# Patient Record
Sex: FEMALE | Race: BLACK OR AFRICAN AMERICAN | NOT HISPANIC OR LATINO | Employment: OTHER | URBAN - METROPOLITAN AREA
[De-identification: names, ages, dates, MRNs, and addresses within clinical notes are randomized per-mention and may not be internally consistent; named-entity substitution may affect disease eponyms.]

---

## 2017-02-21 ENCOUNTER — TRANSCRIBE ORDERS (OUTPATIENT)
Dept: LAB | Facility: HOSPITAL | Age: 72
End: 2017-02-21

## 2017-02-21 ENCOUNTER — APPOINTMENT (OUTPATIENT)
Dept: LAB | Facility: HOSPITAL | Age: 72
End: 2017-02-21
Payer: COMMERCIAL

## 2017-02-21 DIAGNOSIS — E66.9 OBESITY, UNSPECIFIED: ICD-10-CM

## 2017-02-21 DIAGNOSIS — I10 UNSPECIFIED ESSENTIAL HYPERTENSION: ICD-10-CM

## 2017-02-21 DIAGNOSIS — I10 UNSPECIFIED ESSENTIAL HYPERTENSION: Primary | ICD-10-CM

## 2017-02-21 LAB
ANION GAP SERPL CALCULATED.3IONS-SCNC: 6 MMOL/L (ref 4–13)
BUN SERPL-MCNC: 13 MG/DL (ref 5–25)
CALCIUM SERPL-MCNC: 9.3 MG/DL (ref 8.3–10.1)
CHLORIDE SERPL-SCNC: 110 MMOL/L (ref 100–108)
CHOLEST SERPL-MCNC: 137 MG/DL (ref 50–200)
CO2 SERPL-SCNC: 28 MMOL/L (ref 21–32)
CREAT SERPL-MCNC: 1.39 MG/DL (ref 0.6–1.3)
GFR SERPL CREATININE-BSD FRML MDRD: 45.3 ML/MIN/1.73SQ M
GLUCOSE SERPL-MCNC: 101 MG/DL (ref 65–140)
HDLC SERPL-MCNC: 71 MG/DL (ref 40–60)
LDLC SERPL CALC-MCNC: 52 MG/DL (ref 0–100)
POTASSIUM SERPL-SCNC: 4.8 MMOL/L (ref 3.5–5.3)
SODIUM SERPL-SCNC: 144 MMOL/L (ref 136–145)
TRIGL SERPL-MCNC: 68 MG/DL
TSH SERPL DL<=0.05 MIU/L-ACNC: 1.15 UIU/ML (ref 0.36–3.74)

## 2017-02-21 PROCEDURE — 36415 COLL VENOUS BLD VENIPUNCTURE: CPT

## 2017-02-21 PROCEDURE — 80061 LIPID PANEL: CPT

## 2017-02-21 PROCEDURE — 80048 BASIC METABOLIC PNL TOTAL CA: CPT

## 2017-02-21 PROCEDURE — 84443 ASSAY THYROID STIM HORMONE: CPT

## 2017-05-12 ENCOUNTER — TRANSCRIBE ORDERS (OUTPATIENT)
Dept: RADIOLOGY | Facility: HOSPITAL | Age: 72
End: 2017-05-12

## 2017-05-12 ENCOUNTER — HOSPITAL ENCOUNTER (OUTPATIENT)
Dept: RADIOLOGY | Facility: HOSPITAL | Age: 72
Discharge: HOME | End: 2017-05-12
Payer: COMMERCIAL

## 2017-05-12 DIAGNOSIS — M54.12 BRACHIAL NEURITIS OR RADICULITIS NOS: Primary | ICD-10-CM

## 2017-05-12 DIAGNOSIS — M54.12 BRACHIAL NEURITIS OR RADICULITIS NOS: ICD-10-CM

## 2017-05-12 DIAGNOSIS — M43.6 CONTRACTURE OF NECK: ICD-10-CM

## 2017-05-12 PROCEDURE — 72052 X-RAY EXAM NECK SPINE 6/>VWS: CPT

## 2018-01-29 ENCOUNTER — TRANSCRIBE ORDERS (OUTPATIENT)
Dept: LAB | Facility: HOSPITAL | Age: 73
End: 2018-01-29

## 2018-01-29 ENCOUNTER — APPOINTMENT (OUTPATIENT)
Dept: LAB | Facility: HOSPITAL | Age: 73
End: 2018-01-29
Payer: MEDICARE

## 2018-01-29 DIAGNOSIS — N18.30 CHRONIC KIDNEY DISEASE, STAGE III (MODERATE) (HCC): Primary | ICD-10-CM

## 2018-01-29 DIAGNOSIS — I12.0 PARENCHYMAL RENAL HYPERTENSION, STAGE 5 CHRONIC KIDNEY DISEASE OR END STAGE RENAL DISEASE (HCC): ICD-10-CM

## 2018-01-29 DIAGNOSIS — N18.30 CHRONIC KIDNEY DISEASE, STAGE III (MODERATE) (HCC): ICD-10-CM

## 2018-01-29 DIAGNOSIS — N28.1 ACQUIRED CYST OF KIDNEY: ICD-10-CM

## 2018-01-29 LAB
25(OH)D3 SERPL-MCNC: 33.6 NG/ML (ref 30–100)
ANION GAP SERPL CALCULATED.3IONS-SCNC: 5 MMOL/L (ref 4–13)
BASOPHILS # BLD AUTO: 0.03 THOUSANDS/ΜL (ref 0–0.1)
BASOPHILS NFR BLD AUTO: 1 % (ref 0–1)
BILIRUB UR QL STRIP: NEGATIVE
BUN SERPL-MCNC: 14 MG/DL (ref 5–25)
CALCIUM SERPL-MCNC: 9 MG/DL (ref 8.3–10.1)
CHLORIDE SERPL-SCNC: 108 MMOL/L (ref 100–108)
CLARITY UR: NORMAL
CO2 SERPL-SCNC: 29 MMOL/L (ref 21–32)
COLOR UR: YELLOW
CREAT SERPL-MCNC: 1.23 MG/DL (ref 0.6–1.3)
CREAT UR-MCNC: 130 MG/DL
EOSINOPHIL # BLD AUTO: 0.16 THOUSAND/ΜL (ref 0–0.61)
EOSINOPHIL NFR BLD AUTO: 4 % (ref 0–6)
ERYTHROCYTE [DISTWIDTH] IN BLOOD BY AUTOMATED COUNT: 14.8 % (ref 11.6–15.1)
GFR SERPL CREATININE-BSD FRML MDRD: 51 ML/MIN/1.73SQ M
GLUCOSE P FAST SERPL-MCNC: 88 MG/DL (ref 65–99)
GLUCOSE UR STRIP-MCNC: NEGATIVE MG/DL
HCT VFR BLD AUTO: 36 % (ref 34.8–46.1)
HGB BLD-MCNC: 11.9 G/DL (ref 11.5–15.4)
HGB UR QL STRIP.AUTO: NEGATIVE
KETONES UR STRIP-MCNC: NEGATIVE MG/DL
LEUKOCYTE ESTERASE UR QL STRIP: NEGATIVE
LYMPHOCYTES # BLD AUTO: 1.85 THOUSANDS/ΜL (ref 0.6–4.47)
LYMPHOCYTES NFR BLD AUTO: 43 % (ref 14–44)
MCH RBC QN AUTO: 26.1 PG (ref 26.8–34.3)
MCHC RBC AUTO-ENTMCNC: 33.1 G/DL (ref 31.4–37.4)
MCV RBC AUTO: 79 FL (ref 82–98)
MONOCYTES # BLD AUTO: 0.37 THOUSAND/ΜL (ref 0.17–1.22)
MONOCYTES NFR BLD AUTO: 9 % (ref 4–12)
NEUTROPHILS # BLD AUTO: 1.79 THOUSANDS/ΜL (ref 1.85–7.62)
NEUTS SEG NFR BLD AUTO: 43 % (ref 43–75)
NITRITE UR QL STRIP: NEGATIVE
NRBC BLD AUTO-RTO: 0 /100 WBCS
PH UR STRIP.AUTO: 6 [PH] (ref 4.5–8)
PLATELET # BLD AUTO: 221 THOUSANDS/UL (ref 149–390)
PMV BLD AUTO: 10 FL (ref 8.9–12.7)
POTASSIUM SERPL-SCNC: 4.3 MMOL/L (ref 3.5–5.3)
PROT UR STRIP-MCNC: NEGATIVE MG/DL
PROT UR-MCNC: 6 MG/DL
PROT/CREAT UR: 0.05 MG/G{CREAT} (ref 0–0.1)
PTH-INTACT SERPL-MCNC: 81.6 PG/ML (ref 14–72)
RBC # BLD AUTO: 4.56 MILLION/UL (ref 3.81–5.12)
SODIUM SERPL-SCNC: 142 MMOL/L (ref 136–145)
SP GR UR STRIP.AUTO: 1.01 (ref 1–1.03)
UROBILINOGEN UR QL STRIP.AUTO: 0.2 E.U./DL
WBC # BLD AUTO: 4.2 THOUSAND/UL (ref 4.31–10.16)

## 2018-01-29 PROCEDURE — 80048 BASIC METABOLIC PNL TOTAL CA: CPT

## 2018-01-29 PROCEDURE — 83970 ASSAY OF PARATHORMONE: CPT

## 2018-01-29 PROCEDURE — 84156 ASSAY OF PROTEIN URINE: CPT

## 2018-01-29 PROCEDURE — 82570 ASSAY OF URINE CREATININE: CPT

## 2018-01-29 PROCEDURE — 81003 URINALYSIS AUTO W/O SCOPE: CPT

## 2018-01-29 PROCEDURE — 85025 COMPLETE CBC W/AUTO DIFF WBC: CPT

## 2018-01-29 PROCEDURE — 82306 VITAMIN D 25 HYDROXY: CPT

## 2018-01-29 PROCEDURE — 36415 COLL VENOUS BLD VENIPUNCTURE: CPT

## 2018-11-05 ENCOUNTER — OFFICE VISIT (OUTPATIENT)
Dept: LAB | Facility: HOSPITAL | Age: 73
End: 2018-11-05
Payer: MEDICARE

## 2018-11-05 ENCOUNTER — TRANSCRIBE ORDERS (OUTPATIENT)
Dept: LAB | Facility: HOSPITAL | Age: 73
End: 2018-11-05

## 2018-11-05 DIAGNOSIS — R00.1 SEVERE SINUS BRADYCARDIA: Primary | ICD-10-CM

## 2018-11-05 DIAGNOSIS — R00.1 SEVERE SINUS BRADYCARDIA: ICD-10-CM

## 2018-11-05 LAB
ATRIAL RATE: 61 BPM
P AXIS: 36 DEGREES
PR INTERVAL: 108 MS
QRS AXIS: -51 DEGREES
QRSD INTERVAL: 80 MS
QT INTERVAL: 446 MS
QTC INTERVAL: 448 MS
T WAVE AXIS: -4 DEGREES
VENTRICULAR RATE: 61 BPM

## 2018-11-05 PROCEDURE — 93010 ELECTROCARDIOGRAM REPORT: CPT | Performed by: INTERNAL MEDICINE

## 2018-11-05 PROCEDURE — 93005 ELECTROCARDIOGRAM TRACING: CPT

## 2018-12-05 ENCOUNTER — OFFICE VISIT (OUTPATIENT)
Dept: CARDIOLOGY CLINIC | Facility: CLINIC | Age: 73
End: 2018-12-05
Payer: MEDICARE

## 2018-12-05 VITALS
BODY MASS INDEX: 30.23 KG/M2 | HEART RATE: 72 BPM | DIASTOLIC BLOOD PRESSURE: 78 MMHG | SYSTOLIC BLOOD PRESSURE: 136 MMHG | HEIGHT: 58 IN | WEIGHT: 144 LBS

## 2018-12-05 DIAGNOSIS — R94.31 ABNORMAL EKG: Primary | ICD-10-CM

## 2018-12-05 DIAGNOSIS — E78.00 HYPERCHOLESTEROLEMIA: ICD-10-CM

## 2018-12-05 PROCEDURE — 99203 OFFICE O/P NEW LOW 30 MIN: CPT | Performed by: INTERNAL MEDICINE

## 2018-12-05 RX ORDER — OXYBUTYNIN CHLORIDE 5 MG/1
TABLET ORAL
COMMUNITY
Start: 2018-11-27 | End: 2019-04-17

## 2018-12-05 RX ORDER — AMLODIPINE BESYLATE 5 MG/1
5 TABLET ORAL DAILY
COMMUNITY
Start: 2018-10-08

## 2018-12-05 RX ORDER — FLUOXETINE 10 MG/1
CAPSULE ORAL
COMMUNITY
Start: 2018-09-26

## 2018-12-05 RX ORDER — BUSPIRONE HYDROCHLORIDE 7.5 MG/1
TABLET ORAL
COMMUNITY
Start: 2018-11-12

## 2018-12-05 RX ORDER — ACETAMINOPHEN 500 MG
1 TABLET ORAL
COMMUNITY
Start: 2013-12-12

## 2018-12-05 RX ORDER — ATORVASTATIN CALCIUM 20 MG/1
20 TABLET, FILM COATED ORAL DAILY
COMMUNITY
Start: 2018-10-08

## 2018-12-05 RX ORDER — LISINOPRIL 40 MG/1
40 TABLET ORAL DAILY
COMMUNITY
Start: 2018-10-08

## 2018-12-05 NOTE — PROGRESS NOTES
Cardiology Follow Up    Kena Woodard  1945  [id number]  [address] [facility] [address] [place] [address]  [phone]    1  Abnormal EKG  Holter monitor - 24 hour    Stress test only, exercise    Echo complete with contrast if indicated   2  Hypercholesterolemia         Interval History:   Cardiology consultation  68-year-old [facility] female who has no previous cardiac history  Longstanding history of hypertension over 2 decades, she did undergo an EKG recently, it was described as bradycardic  Personal review, rhythm is sinus rhythm with frequent premature atrial contractions  He is described as short ND which I do not agree with, and there is evidence of poor R-wave progression across the precordium  The patient feels tired at times but she is still very active, she was regularly  She has no exertional symptoms including chest pain or dyspnea, she denies syncope or presyncope  She is on chronic  beta-blocker therapy, the dose was recently reduced because of that     She also history of dyslipidemia on statin therapy  LDL of 52 with an HDL 71 recently done  She has known vascular disease, carotid duplex 2 years ago revealed no significant carotid disease and occluded right vertebral artery  She was told she has chronic kidney disease, recent creatinine of 1 2, GFR in the 50s  Patient Active Problem List   Diagnosis    Abnormal EKG    Hypercholesterolemia     No past medical history on file  Social History     Social History    Marital status: Single     Spouse name: N/A    Number of children: N/A    Years of education: N/A     Occupational History    Not on file       Social History Main Topics    Smoking status: Former Smoker     Types: Cigarettes     Quit date: 1990    Smokeless tobacco: Never Used    Alcohol use Not on file    Drug use: Unknown    Sexual activity: Not on file Other Topics Concern    Not on file     Social History Narrative    No narrative on file      No family history on file  No past surgical history on file  Current Outpatient Prescriptions:     acetaminophen (TYLENOL) 500 mg tablet, Take 1 tablet by mouth, Disp: , Rfl:     amLODIPine (NORVASC) 5 mg tablet, , Disp: , Rfl:     aspirin 81 MG tablet, Take by mouth, Disp: , Rfl:     atorvastatin (LIPITOR) 20 mg tablet, , Disp: , Rfl:     busPIRone (BUSPAR) 7 5 mg tablet, , Disp: , Rfl:     cholecalciferol (VITAMIN D3) 1,000 units tablet, Take 1 tablet by mouth daily, Disp: , Rfl:     FLUoxetine (PROzac) 10 mg capsule, , Disp: , Rfl:     lisinopril (ZESTRIL) 40 mg tablet, , Disp: , Rfl:     metoprolol tartrate (LOPRESSOR) 25 mg tablet, , Disp: , Rfl:     oxybutynin (DITROPAN) 5 mg tablet, , Disp: , Rfl:   Allergies   Allergen Reactions    Penicillins      Other reaction(s): reports yeast infection  Category: Adverse Reaction;        Labs:  Office Visit on 11/05/2018   Component Date Value    Ventricular Rate 11/05/2018 61     Atrial Rate 11/05/2018 61     RI Interval 11/05/2018 108     QRSD Interval 11/05/2018 80     QT Interval 11/05/2018 446     QTC Interval 11/05/2018 448     P Axis 11/05/2018 36     QRS Axis 11/05/2018 -46     T Wave Axis 11/05/2018 -4      Imaging: No results found  Review of Systems:  Review of Systems   Constitutional: Positive for fatigue  Negative for activity change, appetite change, chills, diaphoresis, fever and unexpected weight change  HENT: Negative for nosebleeds  Eyes: Negative for visual disturbance  Respiratory: Negative for apnea, cough, choking, chest tightness, shortness of breath, wheezing and stridor  Cardiovascular: Negative for chest pain, palpitations and leg swelling  Gastrointestinal: Negative for anal bleeding  Endocrine: Negative for cold intolerance  Genitourinary: Negative for difficulty urinating and hematuria  Musculoskeletal: Positive for arthralgias  Negative for gait problem and myalgias  Skin: Negative for pallor  Allergic/Immunologic: Negative for immunocompromised state  Neurological: Negative for dizziness, tremors, syncope, facial asymmetry, speech difficulty, weakness and light-headedness  Hematological: Does not bruise/bleed easily  Psychiatric/Behavioral: Positive for sleep disturbance  Negative for confusion  Physical Exam:  Physical Exam   Constitutional: She is oriented to person, place, and time  She appears well-developed  No distress  Eyes: No scleral icterus  Neck: No JVD present  Cardiovascular: Normal rate, normal heart sounds and intact distal pulses  Exam reveals no gallop and no friction rub  No murmur heard  Frequent extra systoles   Pulmonary/Chest: Effort normal and breath sounds normal  No respiratory distress  She has no wheezes  She has no rales  Abdominal: Soft  Bowel sounds are normal    Neurological: She is alert and oriented to person, place, and time  Skin: Skin is warm and dry  No rash noted  She is not diaphoretic  No erythema  Psychiatric: She has a normal mood and affect  Discussion/Summary:  Sinus bradycardia chronic beta-blocker therapy  No history high-grade AV block  No clear-cut indication for any pacemaker therapy at the present time  Will do Holter monitor as well as stress test and echocardiogram to assess chronotropic competence given the associated abnormal EKG  Further recommendations pending results of the testing  No restrictions on the medications  Will continue low-dose beta-blocker for the time being

## 2018-12-18 ENCOUNTER — TELEPHONE (OUTPATIENT)
Dept: UROLOGY | Facility: MEDICAL CENTER | Age: 73
End: 2018-12-18

## 2018-12-18 NOTE — TELEPHONE ENCOUNTER
Reason for appointment/Complaint/Diagnosis : Stressed Bladder    Insurance: MCR    History of Cancer? HP                       If yes, what kind? No    Previous urologist?     Dr Patterson                 Records requested/where? No    Outside testing/where? No    Location Preference for office visit?  [facility]

## 2019-01-10 ENCOUNTER — HOSPITAL ENCOUNTER (OUTPATIENT)
Dept: NON INVASIVE DIAGNOSTICS | Facility: CLINIC | Age: 74
Discharge: HOME | End: 2019-01-10
Payer: MEDICARE

## 2019-01-10 DIAGNOSIS — R94.31 ABNORMAL EKG: ICD-10-CM

## 2019-01-10 PROCEDURE — 93306 TTE W/DOPPLER COMPLETE: CPT

## 2019-01-10 PROCEDURE — 93225 XTRNL ECG REC<48 HRS REC: CPT

## 2019-01-10 PROCEDURE — 93017 CV STRESS TEST TRACING ONLY: CPT

## 2019-01-10 PROCEDURE — 93226 XTRNL ECG REC<48 HR SCAN A/R: CPT

## 2019-01-10 PROCEDURE — 93306 TTE W/DOPPLER COMPLETE: CPT | Performed by: INTERNAL MEDICINE

## 2019-01-10 PROCEDURE — 93018 CV STRESS TEST I&R ONLY: CPT | Performed by: INTERNAL MEDICINE

## 2019-01-10 PROCEDURE — 93016 CV STRESS TEST SUPVJ ONLY: CPT | Performed by: INTERNAL MEDICINE

## 2019-01-11 LAB
CHEST PAIN STATEMENT: NORMAL
MAX DIASTOLIC BP: 80 MMHG
MAX HEART RATE: 118 BPM
MAX PREDICTED HEART RATE: 147 BPM
MAX. SYSTOLIC BP: 160 MMHG
PROTOCOL NAME: NORMAL
TARGET HR FORMULA: NORMAL
TEST INDICATION: NORMAL
TIME IN EXERCISE PHASE: NORMAL

## 2019-01-11 PROCEDURE — 93227 XTRNL ECG REC<48 HR R&I: CPT | Performed by: INTERNAL MEDICINE

## 2019-01-25 ENCOUNTER — OFFICE VISIT (OUTPATIENT)
Dept: UROLOGY | Facility: AMBULATORY SURGERY CENTER | Age: 74
End: 2019-01-25
Payer: MEDICARE

## 2019-01-25 VITALS
HEART RATE: 59 BPM | WEIGHT: 143 LBS | HEIGHT: 59 IN | DIASTOLIC BLOOD PRESSURE: 60 MMHG | SYSTOLIC BLOOD PRESSURE: 130 MMHG | BODY MASS INDEX: 28.83 KG/M2

## 2019-01-25 DIAGNOSIS — N39.41 URGE INCONTINENCE OF URINE: Primary | ICD-10-CM

## 2019-01-25 PROCEDURE — 99204 OFFICE O/P NEW MOD 45 MIN: CPT | Performed by: UROLOGY

## 2019-01-25 NOTE — LETTER
January 25, 2019     Zuleyma Mehtaemiliano Anetaemily [address] [facility]    Patient: Fidela Apley   YOB: 1945   Date of Visit: 1/25/2019       Dear Dr Adalberto Barreto: Thank you for referring Poppy Espino to me for evaluation  Below are my notes for this consultation  If you have questions, please do not hesitate to call me  I look forward to following your patient along with you  Sincerely,        Bren Quintanilla MD        CC: No Recipients  Bren Quintanilla MD  1/25/2019  2:49 PM  Sign at close encounter  1/25/2019    Del Butler  1945  [id number]        Assessment  Urinary urgency and incontinence    Plan  I recommend continuing her current medication and adding Myrbetriq 50 mg   Prescription was sent to her pharmacy  It appears that this is covered by her insurance  I also recommend further evaluation with pelvic examination and cystoscopy to evaluate the bladder outlet and any postoperative changes and possibility of mesh erosion as result of her surgery  We will also check a postvoid residual at that time  Patient understands and agrees with this plan  History of Present Illness  Del Clay is a 68 y o  female previously seen in the Urology Clinic in 2016  At the time she was on oxybutynin 15 mg  Currently she is taking oxybutynin 10 mg twice daily, she thinks, and continues to have urinary urgency and occasional urge incontinence  She wears pads every day, but does not soak through them and does not have to change them  It is quite bothersome  At the time of her last examination, there was no evidence of pelvic organ prolapse  She has a history of 4 vaginal deliveries without incident  She did have a mesh surgery performed in [place] in 2005  She says that this is not really help her incontinence  She drinks 1 cup of coffee a day, sips of water, and drinks club soda throughout the day              Review of Systems  Review of Systems Constitutional: Negative  HENT: Negative  Respiratory: Negative  Cardiovascular: Negative  Gastrointestinal: Negative  Genitourinary:        As per HPI   Musculoskeletal: Negative  Skin: Negative  Neurological: Negative  Hematological: Negative  Past Medical History  Past Medical History:   Diagnosis Date    Chronic kidney disease     CKD stage 3    Hyperlipidemia     Hypertension        Past Social History  History reviewed  No pertinent surgical history  Past Family History  History reviewed  No pertinent family history  Past Social history  Social History     Social History    Marital status: Single     Spouse name: N/A    Number of children: N/A    Years of education: N/A     Occupational History    Not on file  Social History Main Topics    Smoking status: Former Smoker     Types: Cigarettes     Quit date: 1990    Smokeless tobacco: Never Used    Alcohol use No    Drug use: No    Sexual activity: Not on file     Other Topics Concern    Not on file     Social History Narrative    No narrative on file     History   Smoking Status    Former Smoker    Types: Cigarettes    Quit date: 1990   Smokeless Tobacco    Never Used       Current Medications  Current Outpatient Prescriptions   Medication Sig Dispense Refill    acetaminophen (TYLENOL) 500 mg tablet Take 1 tablet by mouth      amLODIPine (NORVASC) 5 mg tablet       aspirin 81 MG tablet Take by mouth      atorvastatin (LIPITOR) 20 mg tablet       busPIRone (BUSPAR) 7 5 mg tablet       cholecalciferol (VITAMIN D3) 1,000 units tablet Take 1 tablet by mouth daily      FLUoxetine (PROzac) 10 mg capsule       lisinopril (ZESTRIL) 40 mg tablet       metoprolol tartrate (LOPRESSOR) 25 mg tablet       oxybutynin (DITROPAN) 5 mg tablet       Mirabegron ER 50 MG TB24 Take 1 tablet (50 mg total) by mouth daily 30 tablet 11     No current facility-administered medications for this visit  Allergies  Allergies   Allergen Reactions    Penicillins      Other reaction(s): reports yeast infection  Category: Adverse Reaction;        Past Medical History, Social History, Family History, medications and allergies were reviewed  Vitals  Vitals:    01/25/19 1335   BP: 130/60   Pulse: 59   Weight: 64 9 kg (143 lb)   Height: 4' 10 5" (1 486 m)       Physical Exam  Physical Exam   Constitutional: She is oriented to person, place, and time  She appears well-developed and well-nourished  Cardiovascular: Normal rate  Pulmonary/Chest: Effort normal    Abdominal: Soft  Genitourinary:   Genitourinary Comments: No CVA tenderness  Pelvic exam deferred  Musculoskeletal: Normal range of motion  Neurological: She is alert and oriented to person, place, and time  Skin: Skin is warm, dry and intact  Psychiatric: She has a normal mood and affect  Vitals reviewed          Results  No results found for: PSA  Lab Results   Component Value Date    GLUCOSE 108 05/11/2015    CALCIUM 9 0 01/29/2018     05/11/2015    K 4 3 01/29/2018    CO2 29 01/29/2018     01/29/2018    BUN 14 01/29/2018    CREATININE 1 23 01/29/2018     Lab Results   Component Value Date    WBC 4 20 (L) 01/29/2018    HGB 11 9 01/29/2018    HCT 36 0 01/29/2018    MCV 79 (L) 01/29/2018     01/29/2018

## 2019-01-25 NOTE — PROGRESS NOTES
1/25/2019    Sarahy Butler  1945  [id number]        Assessment  Urinary urgency and incontinence    Plan  I recommend continuing her current medication and adding Myrbetriq 50 mg   Prescription was sent to her pharmacy  It appears that this is covered by her insurance  I also recommend further evaluation with pelvic examination and cystoscopy to evaluate the bladder outlet and any postoperative changes and possibility of mesh erosion as result of her surgery  We will also check a postvoid residual at that time  Patient understands and agrees with this plan  History of Present Illness  Stan Viera is a 68 y o  female previously seen in the Urology Clinic in 2016  At the time she was on oxybutynin 15 mg  Currently she is taking oxybutynin 10 mg twice daily, she thinks, and continues to have urinary urgency and occasional urge incontinence  She wears pads every day, but does not soak through them and does not have to change them  It is quite bothersome  At the time of her last examination, there was no evidence of pelvic organ prolapse  She has a history of 4 vaginal deliveries without incident  She did have a mesh surgery performed in [place] in 2005  She says that this is not really help her incontinence  She drinks 1 cup of coffee a day, sips of water, and drinks club soda throughout the day  Review of Systems  Review of Systems   Constitutional: Negative  HENT: Negative  Respiratory: Negative  Cardiovascular: Negative  Gastrointestinal: Negative  Genitourinary:        As per HPI   Musculoskeletal: Negative  Skin: Negative  Neurological: Negative  Hematological: Negative  Past Medical History  Past Medical History:   Diagnosis Date    Chronic kidney disease     CKD stage 3    Hyperlipidemia     Hypertension        Past Social History  History reviewed  No pertinent surgical history  Past Family History  History reviewed   No pertinent family history  Past Social history  Social History     Social History    Marital status: Single     Spouse name: N/A    Number of children: N/A    Years of education: N/A     Occupational History    Not on file  Social History Main Topics    Smoking status: Former Smoker     Types: Cigarettes     Quit date: 1990    Smokeless tobacco: Never Used    Alcohol use No    Drug use: No    Sexual activity: Not on file     Other Topics Concern    Not on file     Social History Narrative    No narrative on file     History   Smoking Status    Former Smoker    Types: Cigarettes    Quit date: 1990   Smokeless Tobacco    Never Used       Current Medications  Current Outpatient Prescriptions   Medication Sig Dispense Refill    acetaminophen (TYLENOL) 500 mg tablet Take 1 tablet by mouth      amLODIPine (NORVASC) 5 mg tablet       aspirin 81 MG tablet Take by mouth      atorvastatin (LIPITOR) 20 mg tablet       busPIRone (BUSPAR) 7 5 mg tablet       cholecalciferol (VITAMIN D3) 1,000 units tablet Take 1 tablet by mouth daily      FLUoxetine (PROzac) 10 mg capsule       lisinopril (ZESTRIL) 40 mg tablet       metoprolol tartrate (LOPRESSOR) 25 mg tablet       oxybutynin (DITROPAN) 5 mg tablet       Mirabegron ER 50 MG TB24 Take 1 tablet (50 mg total) by mouth daily 30 tablet 11     No current facility-administered medications for this visit  Allergies  Allergies   Allergen Reactions    Penicillins      Other reaction(s): reports yeast infection  Category: Adverse Reaction;        Past Medical History, Social History, Family History, medications and allergies were reviewed  Vitals  Vitals:    01/25/19 1335   BP: 130/60   Pulse: 59   Weight: 64 9 kg (143 lb)   Height: 4' 10 5" (1 486 m)       Physical Exam  Physical Exam   Constitutional: She is oriented to person, place, and time  She appears well-developed and well-nourished  Cardiovascular: Normal rate      Pulmonary/Chest: Effort normal  Abdominal: Soft  Genitourinary:   Genitourinary Comments: No CVA tenderness  Pelvic exam deferred  Musculoskeletal: Normal range of motion  Neurological: She is alert and oriented to person, place, and time  Skin: Skin is warm, dry and intact  Psychiatric: She has a normal mood and affect  Vitals reviewed          Results  No results found for: PSA  Lab Results   Component Value Date    GLUCOSE 108 05/11/2015    CALCIUM 9 0 01/29/2018     05/11/2015    K 4 3 01/29/2018    CO2 29 01/29/2018     01/29/2018    BUN 14 01/29/2018    CREATININE 1 23 01/29/2018     Lab Results   Component Value Date    WBC 4 20 (L) 01/29/2018    HGB 11 9 01/29/2018    HCT 36 0 01/29/2018    MCV 79 (L) 01/29/2018     01/29/2018

## 2019-01-29 ENCOUNTER — TELEPHONE (OUTPATIENT)
Dept: UROLOGY | Facility: MEDICAL CENTER | Age: 74
End: 2019-01-29

## 2019-01-29 NOTE — TELEPHONE ENCOUNTER
Patient left a message on the [facility] Center voice mail system  Message very obscured and difficult to understand  I did call her back but was only able to leave a message for the patient to return my call  No further action required at this time

## 2019-04-17 ENCOUNTER — PROCEDURE VISIT (OUTPATIENT)
Dept: UROLOGY | Facility: AMBULATORY SURGERY CENTER | Age: 74
End: 2019-04-17
Payer: MEDICARE

## 2019-04-17 VITALS
SYSTOLIC BLOOD PRESSURE: 130 MMHG | BODY MASS INDEX: 29.84 KG/M2 | HEIGHT: 59 IN | DIASTOLIC BLOOD PRESSURE: 80 MMHG | WEIGHT: 148 LBS | HEART RATE: 60 BPM

## 2019-04-17 DIAGNOSIS — N39.41 URGE INCONTINENCE OF URINE: Primary | ICD-10-CM

## 2019-04-17 LAB
SL AMB  POCT GLUCOSE, UA: ABNORMAL
SL AMB LEUKOCYTE ESTERASE,UA: ABNORMAL
SL AMB POCT BILIRUBIN,UA: ABNORMAL
SL AMB POCT BLOOD,UA: ABNORMAL
SL AMB POCT CLARITY,UA: CLEAR
SL AMB POCT COLOR,UA: YELLOW
SL AMB POCT KETONES,UA: ABNORMAL
SL AMB POCT NITRITE,UA: ABNORMAL
SL AMB POCT PH,UA: 5
SL AMB POCT SPECIFIC GRAVITY,UA: 1.02
SL AMB POCT URINE PROTEIN: ABNORMAL
SL AMB POCT UROBILINOGEN: ABNORMAL

## 2019-04-17 PROCEDURE — 52000 CYSTOURETHROSCOPY: CPT | Performed by: UROLOGY

## 2019-04-17 PROCEDURE — 99213 OFFICE O/P EST LOW 20 MIN: CPT | Performed by: UROLOGY

## 2019-04-17 PROCEDURE — 81002 URINALYSIS NONAUTO W/O SCOPE: CPT | Performed by: UROLOGY

## 2019-04-17 RX ORDER — OXYBUTYNIN CHLORIDE 5 MG/1
10 TABLET ORAL 2 TIMES DAILY
Qty: 120 TABLET | Refills: 11 | Status: SHIPPED | OUTPATIENT
Start: 2019-04-17 | End: 2020-05-06

## 2019-12-05 ENCOUNTER — OFFICE VISIT (OUTPATIENT)
Dept: CARDIOLOGY CLINIC | Facility: CLINIC | Age: 74
End: 2019-12-05
Payer: MEDICARE

## 2019-12-05 VITALS
BODY MASS INDEX: 31.93 KG/M2 | WEIGHT: 148 LBS | DIASTOLIC BLOOD PRESSURE: 76 MMHG | HEIGHT: 57 IN | SYSTOLIC BLOOD PRESSURE: 122 MMHG | HEART RATE: 66 BPM

## 2019-12-05 DIAGNOSIS — E78.00 HYPERCHOLESTEROLEMIA: ICD-10-CM

## 2019-12-05 DIAGNOSIS — I49.3 PVC (PREMATURE VENTRICULAR CONTRACTION): ICD-10-CM

## 2019-12-05 DIAGNOSIS — I49.1 PAC (PREMATURE ATRIAL CONTRACTION): ICD-10-CM

## 2019-12-05 DIAGNOSIS — R94.31 ABNORMAL EKG: Primary | ICD-10-CM

## 2019-12-05 PROCEDURE — 99213 OFFICE O/P EST LOW 20 MIN: CPT | Performed by: INTERNAL MEDICINE

## 2019-12-05 NOTE — PROGRESS NOTES
Cardiology Follow Up    Nicole Ramos  1945  [id number]  [facility] [address]  [phone]    1  Abnormal EKG     2  Hypercholesterolemia     3  PAC (premature atrial contraction)     4  PVC (premature ventricular contraction)         Interval History:   Cardiology follow-up  Patient currently clinically stable  Denies any significant palpitations  No syncope or presyncope  No chest pain or significant dyspnea  Compliant medications, compliant low-cholesterol diet, no recent lipid profile LDL last checked over a year ago is 46 with an HDL 71 on medium intensity statin therapy  Compliant with low-sodium diet, blood pressures been well control  Ambulatory    No regular exercise    Patient Active Problem List   Diagnosis    Abnormal EKG    Hypercholesterolemia    PAC (premature atrial contraction)    PVC (premature ventricular contraction)     Past Medical History:   Diagnosis Date    Chronic kidney disease     CKD stage 3    Hyperlipidemia     Hypertension      Social History     Socioeconomic History    Marital status: Single     Spouse name: Not on file    Number of children: Not on file    Years of education: Not on file    Highest education level: Not on file   Occupational History    Not on file   Social Needs    Financial resource strain: Not on file    Food insecurity:     Worry: Not on file     Inability: Not on file    Transportation needs:     Medical: Not on file     Non-medical: Not on file   Tobacco Use    Smoking status: Former Smoker     Types: Cigarettes     Last attempt to quit:      Years since quittin 9    Smokeless tobacco: Never Used   Substance and Sexual Activity    Alcohol use: No    Drug use: No    Sexual activity: Not on file   Lifestyle    Physical activity:     Days per week: Not on file     Minutes per session: Not on file  Stress: Not on file   Relationships    Social connections:     Talks on phone: Not on file     Gets together: Not on file     Attends Hinduism service: Not on file     Active member of club or organization: Not on file     Attends meetings of clubs or organizations: Not on file     Relationship status: Not on file    Intimate partner violence:     Fear of current or ex partner: Not on file     Emotionally abused: Not on file     Physically abused: Not on file     Forced sexual activity: Not on file   Other Topics Concern    Not on file   Social History Narrative    Not on file      No family history on file  No past surgical history on file  Current Outpatient Medications:     acetaminophen (TYLENOL) 500 mg tablet, Take 1 tablet by mouth, Disp: , Rfl:     amLODIPine (NORVASC) 5 mg tablet, , Disp: , Rfl:     aspirin 81 MG tablet, Take by mouth, Disp: , Rfl:     atorvastatin (LIPITOR) 20 mg tablet, , Disp: , Rfl:     busPIRone (BUSPAR) 7 5 mg tablet, , Disp: , Rfl:     cholecalciferol (VITAMIN D3) 1,000 units tablet, Take 1 tablet by mouth daily, Disp: , Rfl:     FLUoxetine (PROzac) 10 mg capsule, , Disp: , Rfl:     lisinopril (ZESTRIL) 40 mg tablet, , Disp: , Rfl:     metoprolol tartrate (LOPRESSOR) 25 mg tablet, , Disp: , Rfl:     oxybutynin (DITROPAN) 5 mg tablet, Take 2 tablets (10 mg total) by mouth 2 (two) times a day for 30 days, Disp: 120 tablet, Rfl: 11  Allergies   Allergen Reactions    Penicillins      Other reaction(s): reports yeast infection  Category: Adverse Reaction;        Labs:  No visits with results within 6 Month(s) from this visit     Latest known visit with results is:   Procedure visit on 04/17/2019   Component Date Value    LEUKOCYTE ESTERASE,UA 04/17/2019 -     NITRITE,UA 04/17/2019 -     SL AMB POCT UROBILINOGEN 04/17/2019 -     POCT URINE PROTEIN 04/17/2019 -      PH,UA 04/17/2019 5 0     BLOOD,UA 04/17/2019 trace     SPECIFIC GRAVITY,UA 04/17/2019 1 020     Kam Case 04/17/2019 -     BILIRUBIN, 04/17/2019 -     GLUCOSE,  04/17/2019 -      COLOR, 04/17/2019 yellow     CLARITY,UA 04/17/2019 clear      Imaging: No results found  Review of Systems:  Review of Systems   Constitutional: Negative for fatigue  Respiratory: Negative for shortness of breath, wheezing and stridor  Cardiovascular: Negative for chest pain, palpitations and leg swelling  Neurological: Negative for dizziness and syncope  Hematological: Does not bruise/bleed easily  Psychiatric/Behavioral: Negative for sleep disturbance  Physical Exam:  Physical Exam   Constitutional: She appears well-developed  No distress  Neck: No JVD present  Cardiovascular: Normal rate, regular rhythm, normal heart sounds and intact distal pulses  Exam reveals no gallop and no friction rub  No murmur heard  Pulmonary/Chest: Effort normal and breath sounds normal  No stridor  No respiratory distress  She has no wheezes  She has no rales  Musculoskeletal: She exhibits no edema  Neurological: She is alert  Skin: Skin is warm  Capillary refill takes less than 2 seconds  She is not diaphoretic  Psychiatric: She has a normal mood and affect  Vitals reviewed  Discussion/Summary:  Abnormal EKG  Stress test this year, the patient did 6 minutes of Mike protocol, there were no EKG changes suggest ischemia slightly sub maximal 80% maximum predicted heart rate for age  An echocardiogram revealed normal left systolic function with stage I diastolic function and mitral calcification but no stenosis  Holter monitor revealed resting normal sinus rhythm average of 69 with mild-to-moderate supraventricular ventricular ectopic activity including triples  Continue current beta-blocker dose  Lipids are well control on statin therapy, will repeat a lipid profile  blood pressure well control as well    She does have peripheral vascular disease on the carotid duplex that revealed occlusion of the right vertebral artery but no disease elsewhere  This note was completed in part utilizing m-modal fluency direct voice recognition software  Grammatical errors, random word insertion, spelling mistakes, and incomplete sentences may be an occasional consequence of the system secondary to software limitations, ambient noise and hardware issues  At the time of dictation, efforts were made to edit, clarify and /or correct errors  Please read the chart carefully and recognize, using context, where substitutions have occurred  If you have any questions or concerns about the context, text or information contained within the body of this dictation, please contact myself, the provider, for further clarification

## 2020-02-22 ENCOUNTER — HOSPITAL ENCOUNTER (EMERGENCY)
Facility: HOSPITAL | Age: 75
Discharge: HOME | End: 2020-02-22
Attending: EMERGENCY MEDICINE
Payer: MEDICARE

## 2020-02-22 ENCOUNTER — APPOINTMENT (EMERGENCY)
Dept: RADIOLOGY | Facility: HOSPITAL | Age: 75
End: 2020-02-22
Payer: MEDICARE

## 2020-02-22 VITALS
SYSTOLIC BLOOD PRESSURE: 161 MMHG | TEMPERATURE: 98.4 F | OXYGEN SATURATION: 100 % | HEART RATE: 55 BPM | BODY MASS INDEX: 29.72 KG/M2 | WEIGHT: 137.35 LBS | RESPIRATION RATE: 18 BRPM | DIASTOLIC BLOOD PRESSURE: 71 MMHG

## 2020-02-22 DIAGNOSIS — R53.1 WEAKNESS: Primary | ICD-10-CM

## 2020-02-22 LAB
ALBUMIN SERPL BCP-MCNC: 3.6 G/DL (ref 3.5–5)
ALP SERPL-CCNC: 89 U/L (ref 46–116)
ALT SERPL W P-5'-P-CCNC: 14 U/L (ref 12–78)
ANION GAP SERPL CALCULATED.3IONS-SCNC: 6 MMOL/L (ref 4–13)
ANISOCYTOSIS BLD QL SMEAR: PRESENT
AST SERPL W P-5'-P-CCNC: 20 U/L (ref 5–45)
BASOPHILS # BLD MANUAL: 0 THOUSAND/UL (ref 0–0.1)
BASOPHILS NFR MAR MANUAL: 0 % (ref 0–1)
BILIRUB SERPL-MCNC: 0.46 MG/DL (ref 0.2–1)
BUN SERPL-MCNC: 8 MG/DL (ref 5–25)
CALCIUM SERPL-MCNC: 9.2 MG/DL (ref 8.3–10.1)
CHLORIDE SERPL-SCNC: 112 MMOL/L (ref 100–108)
CO2 SERPL-SCNC: 27 MMOL/L (ref 21–32)
CREAT SERPL-MCNC: 1.1 MG/DL (ref 0.6–1.3)
EOSINOPHIL # BLD MANUAL: 0.04 THOUSAND/UL (ref 0–0.4)
EOSINOPHIL NFR BLD MANUAL: 1 % (ref 0–6)
ERYTHROCYTE [DISTWIDTH] IN BLOOD BY AUTOMATED COUNT: 15.3 % (ref 11.6–15.1)
FLUAV RNA NPH QL NAA+PROBE: NORMAL
FLUBV RNA NPH QL NAA+PROBE: NORMAL
GFR SERPL CREATININE-BSD FRML MDRD: 57 ML/MIN/1.73SQ M
GLUCOSE SERPL-MCNC: 89 MG/DL (ref 65–140)
HCT VFR BLD AUTO: 38 % (ref 34.8–46.1)
HGB BLD-MCNC: 12.3 G/DL (ref 11.5–15.4)
LYMPHOCYTES # BLD AUTO: 1.81 THOUSAND/UL (ref 0.6–4.47)
LYMPHOCYTES # BLD AUTO: 51 % (ref 14–44)
MCH RBC QN AUTO: 25.6 PG (ref 26.8–34.3)
MCHC RBC AUTO-ENTMCNC: 32.4 G/DL (ref 31.4–37.4)
MCV RBC AUTO: 79 FL (ref 82–98)
MONOCYTES # BLD AUTO: 0.11 THOUSAND/UL (ref 0–1.22)
MONOCYTES NFR BLD: 3 % (ref 4–12)
NEUTROPHILS # BLD MANUAL: 1.17 THOUSAND/UL (ref 1.85–7.62)
NEUTS SEG NFR BLD AUTO: 33 % (ref 43–75)
NRBC BLD AUTO-RTO: 0 /100 WBCS
PLATELET # BLD AUTO: 174 THOUSANDS/UL (ref 149–390)
PLATELET BLD QL SMEAR: ADEQUATE
PMV BLD AUTO: 9.9 FL (ref 8.9–12.7)
POTASSIUM SERPL-SCNC: 4 MMOL/L (ref 3.5–5.3)
PROT SERPL-MCNC: 7.5 G/DL (ref 6.4–8.2)
RBC # BLD AUTO: 4.8 MILLION/UL (ref 3.81–5.12)
RBC MORPH BLD: PRESENT
RSV RNA NPH QL NAA+PROBE: NORMAL
SODIUM SERPL-SCNC: 145 MMOL/L (ref 136–145)
TROPONIN I SERPL-MCNC: 0.03 NG/ML
VARIANT LYMPHS # BLD AUTO: 12 %
WBC # BLD AUTO: 3.55 THOUSAND/UL (ref 4.31–10.16)

## 2020-02-22 PROCEDURE — 87631 RESP VIRUS 3-5 TARGETS: CPT | Performed by: EMERGENCY MEDICINE

## 2020-02-22 PROCEDURE — 84484 ASSAY OF TROPONIN QUANT: CPT | Performed by: EMERGENCY MEDICINE

## 2020-02-22 PROCEDURE — 96360 HYDRATION IV INFUSION INIT: CPT

## 2020-02-22 PROCEDURE — 99284 EMERGENCY DEPT VISIT MOD MDM: CPT

## 2020-02-22 PROCEDURE — 99285 EMERGENCY DEPT VISIT HI MDM: CPT | Performed by: EMERGENCY MEDICINE

## 2020-02-22 PROCEDURE — 71046 X-RAY EXAM CHEST 2 VIEWS: CPT

## 2020-02-22 PROCEDURE — 96361 HYDRATE IV INFUSION ADD-ON: CPT

## 2020-02-22 PROCEDURE — 36415 COLL VENOUS BLD VENIPUNCTURE: CPT | Performed by: EMERGENCY MEDICINE

## 2020-02-22 PROCEDURE — 80053 COMPREHEN METABOLIC PANEL: CPT | Performed by: EMERGENCY MEDICINE

## 2020-02-22 PROCEDURE — 85027 COMPLETE CBC AUTOMATED: CPT | Performed by: EMERGENCY MEDICINE

## 2020-02-22 PROCEDURE — 85007 BL SMEAR W/DIFF WBC COUNT: CPT | Performed by: EMERGENCY MEDICINE

## 2020-02-22 PROCEDURE — NC001 PR NO CHARGE: Performed by: FAMILY MEDICINE

## 2020-02-22 RX ADMIN — SODIUM CHLORIDE 1000 ML: 0.9 INJECTION, SOLUTION INTRAVENOUS at 11:45

## 2020-02-22 NOTE — ED PROVIDER NOTES
History  Chief Complaint   Patient presents with    Cough     Pt has c/o cough for a week     72-year-old female presenting for cough  Patient states last weekend she had cold-like symptoms including congestion, sore throat, cough  Patient states that she thought that she was feeling better by Wednesday however symptoms returned to include dry cough with intermittent in dark sputum, feeling of fever and chills  Patient also states that food tastes differently to her and she has had decreased oral intake due to this, she states that she is still drinking fluids  She has no sore throat or difficulty swallowing  Patient was around sick contacts on Wheat's Day with her family members  Patient denies headache, ear pain, congestion presently, neck stiffness, back pain, chest pain, shortness of breath, abdominal pain  Patient has had several bouts of diarrhea since last night that is non-bloody and non dark  Patient reports no swelling or edema  Prior to Admission Medications   Prescriptions Last Dose Informant Patient Reported? Taking?    FLUoxetine (PROzac) 10 mg capsule  Self Yes No   acetaminophen (TYLENOL) 500 mg tablet  Self Yes No   Sig: Take 1 tablet by mouth   amLODIPine (NORVASC) 5 mg tablet  Self Yes No   Si mg daily    aspirin 81 MG tablet  Self Yes No   Sig: Take 81 mg by mouth daily    atorvastatin (LIPITOR) 20 mg tablet  Self Yes No   Si mg daily    busPIRone (BUSPAR) 7 5 mg tablet  Self Yes No   cholecalciferol (VITAMIN D3) 1,000 units tablet  Self Yes No   Sig: Take 1 tablet by mouth daily   lisinopril (ZESTRIL) 40 mg tablet  Self Yes No   Si mg daily    metoprolol tartrate (LOPRESSOR) 25 mg tablet  Self Yes No   Si mg every 12 (twelve) hours    oxybutynin (DITROPAN) 5 mg tablet   No No   Sig: Take 2 tablets (10 mg total) by mouth 2 (two) times a day for 30 days      Facility-Administered Medications: None       Past Medical History:   Diagnosis Date    Chronic kidney disease     CKD stage 3    Hyperlipidemia     Hypertension        History reviewed  No pertinent surgical history  History reviewed  No pertinent family history  I have reviewed and agree with the history as documented  Social History     Tobacco Use    Smoking status: Former Smoker     Types: Cigarettes     Last attempt to quit:      Years since quittin     Smokeless tobacco: Never Used   Substance Use Topics    Alcohol use: No    Drug use: No        Review of Systems   Constitutional: Positive for chills and fever  HENT: Negative for congestion, ear pain, rhinorrhea, sore throat and trouble swallowing  Respiratory: Positive for cough  Negative for shortness of breath  Cardiovascular: Negative for chest pain and leg swelling  Gastrointestinal: Positive for diarrhea  Negative for abdominal pain, nausea and vomiting  Genitourinary: Negative for dysuria  Musculoskeletal: Positive for arthralgias  Negative for back pain and neck stiffness  Skin: Negative for rash  Neurological: Negative for syncope and headaches  All other systems reviewed and are negative  Physical Exam  ED Triage Vitals [20 1108]   Temperature Pulse Respirations Blood Pressure SpO2   98 4 °F (36 9 °C) 100 20 142/80 100 %      Temp Source Heart Rate Source Patient Position - Orthostatic VS BP Location FiO2 (%)   Oral Monitor Sitting Right arm --      Pain Score       No Pain             Orthostatic Vital Signs  Vitals:    20 1235 20 1415 20 1542 20 1724   BP: 147/71 168/78 168/79 161/71   Pulse: 60 58 (!) 50 55   Patient Position - Orthostatic VS: Lying Lying Lying Sitting       Physical Exam   Constitutional: She appears well-developed and well-nourished  No distress  HENT:   Head: Normocephalic and atraumatic  Dry mucus membranes, no posterior pharynx erythema or exudate   Eyes: Pupils are equal, round, and reactive to light   Conjunctivae and EOM are normal  Right eye exhibits no discharge  Left eye exhibits no discharge  Neck: Normal range of motion  Neck supple  Cardiovascular: Normal rate, regular rhythm and intact distal pulses  Pulmonary/Chest: Effort normal and breath sounds normal  No stridor  No respiratory distress  She has no wheezes  She has no rales  She exhibits no tenderness  Abdominal: Soft  She exhibits no distension  There is no tenderness  There is no guarding  Musculoskeletal: She exhibits no edema or deformity  Lymphadenopathy:     She has no cervical adenopathy  Neurological: She is alert  No sensory deficit  She exhibits normal muscle tone  Coordination normal    Skin: Skin is warm  No rash noted  She is not diaphoretic  Vitals reviewed        ED Medications  Medications   sodium chloride 0 9 % bolus 1,000 mL (0 mL Intravenous Stopped 2/22/20 1333)       Diagnostic Studies  Results Reviewed     Procedure Component Value Units Date/Time    Troponin I [[id number]]  (Normal) Collected:  02/22/20 1844    Lab Status:  Final result Specimen:  Blood from Arm, Left Updated:  02/22/20 1913     Troponin I 0 03 ng/mL     Troponin I [[id number]]  (Normal) Collected:  02/22/20 1456    Lab Status:  Final result Specimen:  Blood from Arm, Left Updated:  02/22/20 1540     Troponin I 0 03 ng/mL     Troponin I [[id number]]  (Normal) Collected:  02/22/20 1248    Lab Status:  Final result Specimen:  Blood from Line, Venous Updated:  02/22/20 1317     Troponin I 0 03 ng/mL     Influenza A/B and RSV PCR [[id number]]  (Normal) Collected:  02/22/20 1147    Lab Status:  Final result Specimen:  Nose Updated:  02/22/20 1245     INFLUENZA A PCR None Detected     INFLUENZA B PCR None Detected     RSV PCR None Detected    CBC and differential [[id number]]  (Abnormal) Collected:  02/22/20 1152    Lab Status:  Final result Specimen:  Blood from Arm, Left Updated:  02/22/20 1240     WBC 3 55 Thousand/uL      RBC 4 80 Million/uL      Hemoglobin 12 3 g/dL      Hematocrit 38 0 %      MCV 79 fL      MCH 25 6 pg      MCHC 32 4 g/dL      RDW 15 3 %      MPV 9 9 fL      Platelets 954 Thousands/uL      nRBC 0 /100 WBCs     Narrative: This is an appended report  These results have been appended to a previously verified report  Comprehensive metabolic panel [[id number]]  (Abnormal) Collected:  02/22/20 1152    Lab Status:  Final result Specimen:  Blood from Arm, Left Updated:  02/22/20 1216     Sodium 145 mmol/L      Potassium 4 0 mmol/L      Chloride 112 mmol/L      CO2 27 mmol/L      ANION GAP 6 mmol/L      BUN 8 mg/dL      Creatinine 1 10 mg/dL      Glucose 89 mg/dL      Calcium 9 2 mg/dL      AST 20 U/L      ALT 14 U/L      Alkaline Phosphatase 89 U/L      Total Protein 7 5 g/dL      Albumin 3 6 g/dL      Total Bilirubin 0 46 mg/dL      eGFR 57 ml/min/1 73sq m     Narrative:       [place] guidelines for Chronic Kidney Disease (CKD):     Stage 1 with normal or high GFR (GFR > 90 mL/min/1 73 square meters)    Stage 2 Mild CKD (GFR = 60-89 mL/min/1 73 square meters)    Stage 3A Moderate CKD (GFR = 45-59 mL/min/1 73 square meters)    Stage 3B Moderate CKD (GFR = 30-44 mL/min/1 73 square meters)    Stage 4 Severe CKD (GFR = 15-29 mL/min/1 73 square meters)    Stage 5 End Stage CKD (GFR <15 mL/min/1 73 square meters)  Note: GFR calculation is accurate only with a steady state creatinine                 XR chest 2 views   Final Result by Nahed Puckett MD (02/22 1313)      No acute cardiopulmonary disease              Workstation performed: [id number]               Procedures  Procedures      ED Course  ED Course as of Feb 22 2207   Sat Feb 22, 2020   1329 Procedure Note: EKG  Date/Time: 02/22/20 1:29 PM   Interpreted by: Jonelle Nelson  Indications / Diagnosis: weakness  ECG reviewed by me, the ED Provider: yes   The EKG demonstrates:  Rhythm: normal sinus  Intervals: normal intervals  Axis: normal axis  QRS/Blocks: normal QRS  ST Changes: No acute ST Changes, no STD/CASSIE           1544 Troponin I: 0 03         HEART Risk Score      Most Recent Value   Heart Score Risk Calculator   History  0 Filed at: 02/22/2020 2207   ECG  0 Filed at: 02/22/2020 2207   Age  2 Filed at: 02/22/2020 2207   Risk Factors  1 Filed at: 02/22/2020 2207   Troponin  0 Filed at: 02/22/2020 2207   HEART Score  3 Filed at: 02/22/2020 2207        Identification of Seniors at Risk      Most Recent Value   (ISAR) Identification of Seniors at Risk   Before the illness or injury that brought you to the Emergency, did you need someone to help you on a regular basis? 0 Filed at: 02/22/2020 1110   In the last 24 hours, have you needed more help than usual?  0 Filed at: 02/22/2020 1110   Have you been hospitalized for one or more nights during the past 6 months? 0 Filed at: 02/22/2020 1110   In general, do you see well?  0 Filed at: 02/22/2020 1110   In general, do you have serious problems with your memory? 0 Filed at: 02/22/2020 1110   Do you take more than three different medications every day? 1 Filed at: 02/22/2020 1110   ISAR Score  1 Filed at: 02/22/2020 1110                          [facility]  Number of Diagnoses or Management Options  Weakness:   Diagnosis management comments: 17-year-old female presenting for cough  Laboratory evaluation shows troponin of 0 03 x3  Patient had no EKG changes with repeat troponins  No other lab abnormalities to explain patient's weakness including electrolyte abnormalities, it decreased kidney function, anemia  Patient was offered admission however declined and was discharged home          Disposition  Final diagnoses:   Weakness     Time reflects when diagnosis was documented in both MDM as applicable and the Disposition within this note     Time User Action Codes Description Comment    2/22/2020  6:37 PM Jaye Hernandes Add [R53 1] Weakness       ED Disposition     ED Disposition Condition Date/Time Comment    Discharge Stable Sat Feb 22, 2020  6:37 PM Sarahy Butler discharge to home/self care  Follow-up Information     Follow up With Specialties Details Why Contact Info Additional Reymundo Simmons, JO ANN Nurse Practitioner Schedule an appointment as soon as possible for a visit  Follow-up with your PCP regarding your symptoms as soon as possible  [address] Emergency Department Emergency Medicine  If symptoms worsen [address]  [phone]  ED, [address] [place] [address], [place], [place], [address]   [phone]          Discharge Medication List as of 2/22/2020  6:38 PM      CONTINUE these medications which have NOT CHANGED    Details   acetaminophen (TYLENOL) 500 mg tablet Take 1 tablet by mouth, Starting Thu 12/12/2013, Historical Med      amLODIPine (NORVASC) 5 mg tablet 5 mg daily , Starting Mon 10/8/2018, Historical Med      aspirin 81 MG tablet Take 81 mg by mouth daily , Historical Med      atorvastatin (LIPITOR) 20 mg tablet 20 mg daily , Starting Mon 10/8/2018, Historical Med      busPIRone (BUSPAR) 7 5 mg tablet Starting Mon 11/12/2018, Historical Med      cholecalciferol (VITAMIN D3) 1,000 units tablet Take 1 tablet by mouth daily, Starting Mon 10/20/2014, Historical Med      FLUoxetine (PROzac) 10 mg capsule Starting Wed 9/26/2018, Historical Med      lisinopril (ZESTRIL) 40 mg tablet 40 mg daily , Starting Mon 10/8/2018, Historical Med      metoprolol tartrate (LOPRESSOR) 25 mg tablet 25 mg every 12 (twelve) hours , Starting Mon 11/12/2018, Historical Med      oxybutynin (DITROPAN) 5 mg tablet Take 2 tablets (10 mg total) by mouth 2 (two) times a day for 30 days, Starting Wed 4/17/2019, Until Fri 5/17/2019, Normal           No discharge procedures on file  ED Provider  Attending physically available and evaluated Sarahy Butler I managed the patient along with the ED Attending      Electronically Signed by         Delfin Oshea DO  02/22/20 2202

## 2020-02-22 NOTE — CONSULTS
Consult- Sushant Allison 1945, 76 y o  female MRN: [id number]    Unit/Bed#: ED 19 Encounter: [id number]    Primary Care Provider: JO ANN Grimaldo   Date and time admitted to hospital: 2/22/2020 11:04 AM      Consult to family practice  Consult performed by: Kurt Noe DO  Consult ordered by: Neha Schaefer MD        Assessment/Plan:    75 y/o female with cough and intermittent lightheadedness evaluated in the ED as below, after lengthy discussion of lab, imaging, and exam findings and feeling 80-90% improved after fluid bolus, despite ED attending recommendation for admission is choosing to go home and will return to hospital if condition worsens  ED course included the following labs, imaging, ekg, and fluid bolus 1L NS  Lab Results   Component Value Date    WBC 3 55 (L) 02/22/2020    HGB 12 3 02/22/2020    HCT 38 0 02/22/2020    MCV 79 (L) 02/22/2020     02/22/2020     Lab Results   Component Value Date     05/11/2015    SODIUM 145 02/22/2020    K 4 0 02/22/2020     (H) 02/22/2020    CO2 27 02/22/2020    ANIONGAP 7 05/11/2015    AGAP 6 02/22/2020    BUN 8 02/22/2020    CREATININE 1 10 02/22/2020    GLUC 89 02/22/2020    GLUF 88 01/29/2018    CALCIUM 9 2 02/22/2020    AST 20 02/22/2020    ALT 14 02/22/2020    ALKPHOS 89 02/22/2020    PROT 7 5 04/28/2015    TP 7 5 02/22/2020    BILITOT 0 38 04/28/2015    TBILI 0 46 02/22/2020    EGFR 57 02/22/2020     XR chest 2 views   Final Result by Indira Hanson MD (02/22 1313)      No acute cardiopulmonary disease  Workstation performed: [id number]           EKG was concerning for low amplitude but on comparison with prior ekgs they appeared to be unchanged  Discussed with patient that ED attending would like to admit her for observation and FM attending is supportive of this decision, however, patient would like to go home   Discussed strict return to hospital precautions including chest pain, shortness of breath, dizziness, lightheadedness, fever, overall worsening condition  Plan discussed with the attending physician, Dr Rik Mccall, on the [facility] Team and Dr Hanane Coppola, the ED attending  Subjective:     77 y/o female presented with her sister who is present for all exam and discussion to ED for evaluation of cough  She reports having cold like symptoms with congestion and sore throat that initially improved and then worsened over the last several days  Today she presented for evaluation of cough and some intermittent lightheadedness  She has several sick contacts  She states she has been drinking mostly tea lately and not much water, and that her mouth feels dry  After fluid bolus she reports feeling significantly improved  No current facility-administered medications for this encounter  Allergies   Allergen Reactions    Penicillins      Other reaction(s): reports yeast infection  Category: Adverse Reaction;        Objective:   Vitals:  Vitals:    02/22/20 1235 02/22/20 1415 02/22/20 1542 02/22/20 1724   BP: 147/71 168/78 168/79 161/71   BP Location: Left arm Right arm Right arm Right arm   Pulse: 60 58 (!) 50 55   Resp: 18 20 18    Temp:       TempSrc:       SpO2: 97% 96% 100% 100%   Weight:             Intake/Output Summary (Last 24 hours) at 2/22/2020 1900  Last data filed at 2/22/2020 1333  Gross per 24 hour   Intake 1000 ml   Output    Net 1000 ml       Physical Exam:   Physical Exam   Constitutional: She is oriented to person, place, and time  She appears well-developed and well-nourished  HENT:   Head: Normocephalic and atraumatic  Mouth/Throat: Oropharynx is clear and moist    Eyes: Pupils are equal, round, and reactive to light  Conjunctivae and EOM are normal    Neck: Normal range of motion  Neck supple  Cardiovascular: Normal rate, regular rhythm, normal heart sounds and intact distal pulses  Pulmonary/Chest: Effort normal and breath sounds normal    Abdominal: Soft   Bowel sounds are normal  She exhibits no distension  There is no tenderness  Musculoskeletal: Normal range of motion  She exhibits no edema  Neurological: She is alert and oriented to person, place, and time  No cranial nerve deficit  Skin: Skin is warm and dry  Capillary refill takes less than 2 seconds  Psychiatric: She has a normal mood and affect   Her behavior is normal        Invasive Devices     None                 Labs:   Admission on 02/22/2020, Discharged on 02/22/2020   Component Date Value    WBC 02/22/2020 3 55*    RBC 02/22/2020 4 80     Hemoglobin 02/22/2020 12 3     Hematocrit 02/22/2020 38 0     MCV 02/22/2020 79*    MCH 02/22/2020 25 6*    MCHC 02/22/2020 32 4     RDW 02/22/2020 15 3*    MPV 02/22/2020 9 9     Platelets 46/36/5523 174     nRBC 02/22/2020 0     Sodium 02/22/2020 145     Potassium 02/22/2020 4 0     Chloride 02/22/2020 112*    CO2 02/22/2020 27     ANION GAP 02/22/2020 6     BUN 02/22/2020 8     Creatinine 02/22/2020 1 10     Glucose 02/22/2020 89     Calcium 02/22/2020 9 2     AST 02/22/2020 20     ALT 02/22/2020 14     Alkaline Phosphatase 02/22/2020 89     Total Protein 02/22/2020 7 5     Albumin 02/22/2020 3 6     Total Bilirubin 02/22/2020 0 46     eGFR 02/22/2020 57     INFLUENZA A PCR 02/22/2020 None Detected     INFLUENZA B PCR 02/22/2020 None Detected     RSV PCR 02/22/2020 None Detected     Segmented % 02/22/2020 33*    Lymphocytes % 02/22/2020 51*    Monocytes % 02/22/2020 3*    Eosinophils, % 02/22/2020 1     Basophils % 02/22/2020 0     Atypical Lymphocytes % 02/22/2020 12*    Absolute Neutrophils 02/22/2020 1 17*    Lymphocytes Absolute 02/22/2020 1 81     Monocytes Absolute 02/22/2020 0 11     Eosinophils Absolute 02/22/2020 0 04     Basophils Absolute 02/22/2020 0 00     RBC Morphology 02/22/2020 Present     Anisocytosis 02/22/2020 Present     Platelet Estimate 33/75/3649 Adequate     Troponin I 02/22/2020 0 03     Troponin I 02/22/2020 0 03 Imaging/Other:    XR chest 2 views   Final Result by Phil Goel MD (02/22 1313)      No acute cardiopulmonary disease              Workstation performed: [address] [place] [address], DO PGY-1   Family Medicine

## 2020-02-22 NOTE — ED ATTENDING ATTESTATION
2/22/2020  I, Melanie Bui MD, saw and evaluated the patient  I have discussed the patient with the resident/non-physician practitioner and agree with the resident's/non-physician practitioner's findings, Plan of Care, and MDM as documented in the resident's/non-physician practitioner's note, except where noted  All available labs and Radiology studies were reviewed  I was present for key portions of any procedure(s) performed by the resident/non-physician practitioner and I was immediately available to provide assistance  At this point I agree with the current assessment done in the Emergency Department  I have conducted an independent evaluation of this patient a history and physical is as follows:    OA: 77 y/o f p/w cough, URI congestion, sore throat and rhinorrhea x 1 week  Pt with + sick contacts at that onset of sxms  Initially felt improved however over the past two days, sxms intensified again and now with cough productive of brown sputum and a generalized feeling of weakness, denies syncope  + decreased PO intake for solids, tolerating fluids but less than normal  + loose, non-bloody stools x 2-3 episodes yesterday, none today and otherwise denies abd pain, n/v or urinary sxms  No cp/pressure/sob  NO headache/dizziness  No focal numbness/weakness/tingling  PE, well developed f in NAD, mildly dry MM, clear sclera/conjunctiva, posterior orophyanx without erythema, mildly dry MM, neck supple/FROM, - JVD, - LN, RR, -murmurs, lungs CTAB, -w/r, -rhonchi, abd soft, +Bs, -r/g, - edema, - calf ttp, + 2 distal pulses and capillary refill < 2 sec, AAO  A/p congestion with cough, CXR, labs including EKG given cardiac risk factors, influenza, IVF hdyration, treat accordingly and re-evaluate  ED Course   discussed with pt for admission given c/o generalized weakness, cough and risk factors  Pt agreeable to admission, family medicine resident will come to evaluate the patient for admission  Discussed admission with the patient  Initially agreeable however she has now decided for discharge  She does not wish to stay at this time  Understands risks and benefits of leaving given her PMH and initial complaint  All labs reviewed with the patient and her sister  States she feels improved at this time, ambulating in the ED, tolerating fluids  Pt is AAO and demonstrates clear decision making capacity  Agreeable to third troponin but does not wish to stay for results  States she will return if it is elevated but will not stay now for the results  Again understands risks and benefits of doing so  Again advised admission and discussed risks of missed idangosis, return of sxms, permanent disability and or death  pt continues to decline  Sister at bedside and nursing aware of patient's decisions for dc and no admission at this time  Seen by family medicine residents who discussed case with attending  Pt notes she feels signficantly improved and will f/u with her PCP as planned with family medicine today or will return if sxms return  Appreciative of care and again expresses understanding of risks and benefits         Critical Care Time  Procedures

## 2020-05-06 DIAGNOSIS — N39.41 URGE INCONTINENCE OF URINE: ICD-10-CM

## 2020-05-06 RX ORDER — OXYBUTYNIN CHLORIDE 5 MG/1
TABLET ORAL
Qty: 120 TABLET | Refills: 0 | Status: SHIPPED | OUTPATIENT
Start: 2020-05-06 | End: 2020-06-11

## 2020-06-10 DIAGNOSIS — N39.41 URGE INCONTINENCE OF URINE: ICD-10-CM

## 2020-06-11 RX ORDER — OXYBUTYNIN CHLORIDE 5 MG/1
TABLET ORAL
Qty: 120 TABLET | Refills: 0 | Status: SHIPPED | OUTPATIENT
Start: 2020-06-11 | End: 2020-07-13

## 2020-07-11 DIAGNOSIS — N39.41 URGE INCONTINENCE OF URINE: ICD-10-CM

## 2020-07-13 RX ORDER — OXYBUTYNIN CHLORIDE 5 MG/1
TABLET ORAL
Qty: 120 TABLET | Refills: 0 | Status: SHIPPED | OUTPATIENT
Start: 2020-07-13 | End: 2020-07-15 | Stop reason: SDUPTHER

## 2020-07-15 RX ORDER — OXYBUTYNIN CHLORIDE 5 MG/1
10 TABLET ORAL 2 TIMES DAILY
Qty: 120 TABLET | Refills: 0 | Status: SHIPPED | OUTPATIENT
Start: 2020-07-15 | End: 2020-08-18 | Stop reason: ALTCHOICE

## 2020-07-15 NOTE — TELEPHONE ENCOUNTER
Script never reached the intended pharmacy    Request was requeued and forwarded to the Advanced Practitioner covering the [facility] location for approval

## 2020-08-06 ENCOUNTER — TRANSCRIBE ORDERS (OUTPATIENT)
Dept: LAB | Facility: HOSPITAL | Age: 75
End: 2020-08-06

## 2020-08-06 ENCOUNTER — APPOINTMENT (OUTPATIENT)
Dept: LAB | Facility: HOSPITAL | Age: 75
End: 2020-08-06
Payer: MEDICARE

## 2020-08-06 DIAGNOSIS — E55.9 VITAMIN D DEFICIENCY, UNSPECIFIED: ICD-10-CM

## 2020-08-06 DIAGNOSIS — E13.9 DIABETES MELLITUS OF OTHER TYPE WITHOUT COMPLICATION, UNSPECIFIED WHETHER LONG TERM INSULIN USE (HCC): ICD-10-CM

## 2020-08-06 DIAGNOSIS — E13.9 DIABETES MELLITUS OF OTHER TYPE WITHOUT COMPLICATION, UNSPECIFIED WHETHER LONG TERM INSULIN USE (HCC): Primary | ICD-10-CM

## 2020-08-06 LAB
25(OH)D3 SERPL-MCNC: 40.9 NG/ML (ref 30–100)
ALBUMIN SERPL BCP-MCNC: 3.8 G/DL (ref 3.5–5)
ALP SERPL-CCNC: 106 U/L (ref 46–116)
ALT SERPL W P-5'-P-CCNC: 16 U/L (ref 12–78)
ANION GAP SERPL CALCULATED.3IONS-SCNC: 7 MMOL/L (ref 4–13)
AST SERPL W P-5'-P-CCNC: 18 U/L (ref 5–45)
BILIRUB SERPL-MCNC: 0.6 MG/DL (ref 0.2–1)
BUN SERPL-MCNC: 13 MG/DL (ref 5–25)
CALCIUM SERPL-MCNC: 9 MG/DL (ref 8.3–10.1)
CHLORIDE SERPL-SCNC: 109 MMOL/L (ref 100–108)
CO2 SERPL-SCNC: 25 MMOL/L (ref 21–32)
CREAT SERPL-MCNC: 1.21 MG/DL (ref 0.6–1.3)
GFR SERPL CREATININE-BSD FRML MDRD: 51 ML/MIN/1.73SQ M
GLUCOSE P FAST SERPL-MCNC: 84 MG/DL (ref 65–99)
POTASSIUM SERPL-SCNC: 3.9 MMOL/L (ref 3.5–5.3)
PROT SERPL-MCNC: 7.5 G/DL (ref 6.4–8.2)
SODIUM SERPL-SCNC: 141 MMOL/L (ref 136–145)

## 2020-08-06 PROCEDURE — 36415 COLL VENOUS BLD VENIPUNCTURE: CPT

## 2020-08-06 PROCEDURE — 82306 VITAMIN D 25 HYDROXY: CPT

## 2020-08-06 PROCEDURE — 80053 COMPREHEN METABOLIC PANEL: CPT

## 2020-08-14 ENCOUNTER — TRANSCRIBE ORDERS (OUTPATIENT)
Dept: ADMINISTRATIVE | Facility: HOSPITAL | Age: 75
End: 2020-08-14

## 2020-08-14 DIAGNOSIS — E55.9 VITAMIN D DEFICIENCY: Primary | ICD-10-CM

## 2020-08-18 ENCOUNTER — OFFICE VISIT (OUTPATIENT)
Dept: UROLOGY | Facility: AMBULATORY SURGERY CENTER | Age: 75
End: 2020-08-18
Payer: MEDICARE

## 2020-08-18 VITALS
SYSTOLIC BLOOD PRESSURE: 124 MMHG | DIASTOLIC BLOOD PRESSURE: 66 MMHG | HEIGHT: 57 IN | HEART RATE: 83 BPM | TEMPERATURE: 98.7 F | WEIGHT: 144.6 LBS | BODY MASS INDEX: 31.2 KG/M2

## 2020-08-18 DIAGNOSIS — N32.81 OAB (OVERACTIVE BLADDER): Primary | ICD-10-CM

## 2020-08-18 PROCEDURE — 99213 OFFICE O/P EST LOW 20 MIN: CPT | Performed by: NURSE PRACTITIONER

## 2020-08-18 RX ORDER — TOLTERODINE 4 MG/1
4 CAPSULE, EXTENDED RELEASE ORAL DAILY
Qty: 30 CAPSULE | Refills: 4 | Status: SHIPPED | OUTPATIENT
Start: 2020-08-18 | End: 2020-12-22 | Stop reason: SDUPTHER

## 2020-08-18 NOTE — PATIENT INSTRUCTIONS
BLADDER HEALTH    WHAT IS CONSIDERED NORMAL?  The average bladder can hold about 2 cups of urine before it needs to be emptied   The normal range of voiding urine is 6 to 8 times during a 24 hour period  As we get older, our bladder capacity can get smaller and we may need to pass urine more frequently but usually not more than every 2 hours   Urine should flow easily without discomfort in a good, steady stream until the bladder is empty  No pushing or straining is necessary to empty the bladder   An urge is a signal that you feel as the bladder stretches to fill with urine  Urges can be felt even if the bladder is not full  Urges are not commands to go to the toilet, merely a signal and can be controlled  WHAT ARE GOOD BLADDER HABITS?  Take your time when emptying your bladder  Dont strain or push to empty your bladder  Make sure you empty your bladder completely each time you pass urine  Do not rush the process   Consistently ignoring the urge to go (waiting more than 4 hours between toileting) or urinating too infrequently may be convenient but not healthy for your bladder   Avoid going to the toilet just in case or more often than every 2 hours  It is usually not necessary to go when you feel the first urge  Try to go only when your bladder is full  Urgency and frequency of urination can be improved by retraining the bladder and spacing your fluid intake throughout the day  Practice good toilet habits  Dont let your bladder control your life  TIPS TO MAINTAIN GOOD BLADDER HABITS   Maintain a good fluid intake  Depending on your body size and environment, drink 6 -8 cups (8 oz each) of fluid per day unless otherwise advised by your doctor  Not enough fluid creates a foul odor and dark color of the urine     Limit the amount of caffeine (coffee, cola, chocolate or tea) and citrus foods that you consume as these foods can be associated with increased sensation of urinary urgency and frequency   Limit the amount of alcohol you drink  Alcohol increases urine production and makes it difficult for the brain to coordinate bladder control   Avoid constipation by maintaining a balanced diet of dietary fiber   Cigarette smoking is also irritating to the bladder surface and is associated with bladder cancer  In addition, the coughing associated with smoking may lead to increased incontinent episodes because of the increased pressure  HOW DIET CAN AFFECT YOUR BLADDER  Although there is no particular "diet" that can cure bladder control, there are certain dietary suggestions you can use to help control the problem  There are 2 points to consider when evaluating how your habits and diet may affect your bladder:    1  Foods and fluids can irritate the bladder  Some foods and beverages are thought to contribute to bladder leakage and irritability  However their effect on the bladder is not completely understood and you may want to see if eliminating one or all of these items improves your bladder control  If you are unable to give them up completely, it is recommended that you use the following items in moderation:   Acidic beverages and foods (orange juice, grapefruit juice, lemonade etc)   Alcoholic beverages   Vinegar   Coffee (regular and decaf)   Tea (regular and decaf)   Caffeinated beverages   Carbonated beverages          2  Drinking enough and the right kinds of fluids  Many people with bladder control issues decrease their intake of liquids in hope that they will need to urinate less frequently or have less urinary leakage   You should not restrict fluids to control your bladder  While a decrease in liquid intake does result in a decrease in the volume of urine, the smaller amount of urine may be more highly concentrated         Highly concentrated, dark yellow urine is irritating to the bladder surface and may actually cause you to go to the bathroom more frequently   It also encourages the growth of bacteria, which may lead to infections resulting in incontinence   Substitutions for Bladder Irritants: water is always the best beverage choice    Grape and apple juice are thirst quenchers are good selections and are not as irritating to the bladder   o Low acid fruits:  Pears, apricots, papaya, watermelon  o For coffee drinkers: KAVA®, Postum®, Robert®, Kaffree Maryan®  o For tea drinkers:  non-citrus or herbal and sun brewed tea

## 2020-08-18 NOTE — PROGRESS NOTES
8/18/2020    Sarahy Butler  1945  [id number]        Assessment  -Overactive bladder    Discussion/Plan  Astrid Wiley is a 76 y o  female being managed by Dr Isidro Erwin       1  OAB- patient continues to experience episodes of urinary frequency  We reviewed dietary and behavioral modifications including avoiding bladder irritants  She is interested in trialing a different anticholinergic  Patient's insurance had previously denied Myrbetriq  Prescription for Detrol was electronically sent to her pharmacy  She will follow up in 8 weeks to re-evaluate her urinary pattern and performed PVR assessment  Patient was instructed to call with any issues    -All questions answered, patients agree with plan     History of Present Illness  76 y o  female with a history of OAB presents today for follow up  Patient last seen in office in April 2019  She underwent cystoscopic evaluation for worsening urinary urgency and incontinence    She also has history of bladder mesh performed in [place] in 2005  Patient admits to drinking at least 1 cup of coffee daily  She continues to take oxybutynin 10 mg b i d , but feels medication is no longer effective  Patient had previously been prescribed Myrbetriq, but medication was cost prohibitive  She denies any episodes of gross hematuria or dysuria  No changes to her overall health  Review of Systems  Review of Systems   Constitutional: Negative  HENT: Negative  Respiratory: Negative  Cardiovascular: Negative  Gastrointestinal: Negative  Genitourinary: Positive for frequency  Negative for decreased urine volume, difficulty urinating, dysuria, flank pain, hematuria and urgency  Musculoskeletal: Negative  Skin: Negative  Neurological: Negative  Psychiatric/Behavioral: Negative          Past Medical History  Past Medical History:   Diagnosis Date    Chronic kidney disease     CKD stage 3    Hyperlipidemia     Hypertension        Past Social History  History reviewed  No pertinent surgical history  Past Family History  History reviewed  No pertinent family history      Past Social history  Social History     Socioeconomic History    Marital status: Single     Spouse name: Not on file    Number of children: Not on file    Years of education: Not on file    Highest education level: Not on file   Occupational History    Not on file   Social Needs    Financial resource strain: Not on file    Food insecurity     Worry: Not on file     Inability: Not on file    Transportation needs     Medical: Not on file     Non-medical: Not on file   Tobacco Use    Smoking status: Former Smoker     Types: Cigarettes     Last attempt to quit:      Years since quittin 6    Smokeless tobacco: Never Used   Substance and Sexual Activity    Alcohol use: No    Drug use: No    Sexual activity: Not on file   Lifestyle    Physical activity     Days per week: Not on file     Minutes per session: Not on file    Stress: Not on file   Relationships    Social connections     Talks on phone: Not on file     Gets together: Not on file     Attends Jain service: Not on file     Active member of club or organization: Not on file     Attends meetings of clubs or organizations: Not on file     Relationship status: Not on file    Intimate partner violence     Fear of current or ex partner: Not on file     Emotionally abused: Not on file     Physically abused: Not on file     Forced sexual activity: Not on file   Other Topics Concern    Not on file   Social History Narrative    Not on file       Current Medications  Current Outpatient Medications   Medication Sig Dispense Refill    acetaminophen (TYLENOL) 500 mg tablet Take 1 tablet by mouth      amLODIPine (NORVASC) 5 mg tablet 5 mg daily       aspirin 81 MG tablet Take 81 mg by mouth daily       atorvastatin (LIPITOR) 20 mg tablet 20 mg daily       busPIRone (BUSPAR) 7 5 mg tablet       cholecalciferol (VITAMIN D3) 1,000 units tablet Take 1 tablet by mouth daily      FLUoxetine (PROzac) 10 mg capsule       lisinopril (ZESTRIL) 40 mg tablet 40 mg daily       metoprolol tartrate (LOPRESSOR) 25 mg tablet 25 mg every 12 (twelve) hours       tolterodine (DETROL LA) 4 mg 24 hr capsule Take 1 capsule (4 mg total) by mouth daily 30 capsule 4     No current facility-administered medications for this visit  Allergies  Allergies   Allergen Reactions    Penicillins      Other reaction(s): reports yeast infection  Category: Adverse Reaction;        Past medical history, social history, family history, medications and allergies were reviewed  Vitals  Vitals:    08/18/20 1402   BP: 124/66   BP Location: Left arm   Patient Position: Sitting   Cuff Size: Adult   Pulse: 83   Temp: 98 7 °F (37 1 °C)   Weight: 65 6 kg (144 lb 9 6 oz)   Height: 4' 9" (1 448 m)       Physical Exam  Physical Exam  Constitutional:       Appearance: Normal appearance  She is well-developed  HENT:      Head: Normocephalic  Eyes:      Pupils: Pupils are equal, round, and reactive to light  Neck:      Musculoskeletal: Normal range of motion  Pulmonary:      Effort: Pulmonary effort is normal    Abdominal:      Palpations: Abdomen is soft  Musculoskeletal: Normal range of motion  Skin:     General: Skin is warm and dry  Neurological:      General: No focal deficit present  Mental Status: She is alert and oriented to person, place, and time  Psychiatric:         Mood and Affect: Mood normal          Behavior: Behavior normal          Thought Content:  Thought content normal          Judgment: Judgment normal          Results    I have personally reviewed all pertinent lab results and reviewed with patient  Lab Results   Component Value Date    GLUCOSE 108 05/11/2015    CALCIUM 9 0 08/06/2020     05/11/2015    K 3 9 08/06/2020    CO2 25 08/06/2020     (H) 08/06/2020    BUN 13 08/06/2020    CREATININE 1 21 08/06/2020     Lab Results   Component Value Date    WBC 3 55 (L) 02/22/2020    HGB 12 3 02/22/2020    HCT 38 0 02/22/2020    MCV 79 (L) 02/22/2020     02/22/2020     No results found for this or any previous visit (from the past 1 hour(s))

## 2020-09-24 ENCOUNTER — HOSPITAL ENCOUNTER (OUTPATIENT)
Dept: RADIOLOGY | Age: 75
Discharge: HOME | End: 2020-09-24
Payer: MEDICARE

## 2020-09-24 DIAGNOSIS — E55.9 VITAMIN D DEFICIENCY: ICD-10-CM

## 2020-09-24 PROCEDURE — 77080 DXA BONE DENSITY AXIAL: CPT

## 2020-11-16 ENCOUNTER — LAB (OUTPATIENT)
Dept: LAB | Facility: HOSPITAL | Age: 75
End: 2020-11-16
Attending: INTERNAL MEDICINE
Payer: MEDICARE

## 2020-11-16 DIAGNOSIS — E78.00 HYPERCHOLESTEROLEMIA: ICD-10-CM

## 2020-11-16 LAB
CHOLEST SERPL-MCNC: 155 MG/DL (ref 50–200)
HDLC SERPL-MCNC: 68 MG/DL
LDLC SERPL CALC-MCNC: 68 MG/DL (ref 0–100)
TRIGL SERPL-MCNC: 96 MG/DL

## 2020-11-16 PROCEDURE — 36415 COLL VENOUS BLD VENIPUNCTURE: CPT

## 2020-11-16 PROCEDURE — 80061 LIPID PANEL: CPT

## 2020-12-16 ENCOUNTER — TELEMEDICINE (OUTPATIENT)
Dept: CARDIOLOGY CLINIC | Facility: CLINIC | Age: 75
End: 2020-12-16
Payer: MEDICARE

## 2020-12-16 VITALS — BODY MASS INDEX: 31.93 KG/M2 | HEIGHT: 57 IN | WEIGHT: 148 LBS

## 2020-12-16 DIAGNOSIS — R94.31 ABNORMAL EKG: ICD-10-CM

## 2020-12-16 DIAGNOSIS — I49.3 PVC (PREMATURE VENTRICULAR CONTRACTION): ICD-10-CM

## 2020-12-16 DIAGNOSIS — E78.00 HYPERCHOLESTEROLEMIA: ICD-10-CM

## 2020-12-16 DIAGNOSIS — I49.1 PAC (PREMATURE ATRIAL CONTRACTION): Primary | ICD-10-CM

## 2020-12-16 DIAGNOSIS — I10 HYPERTENSION, ESSENTIAL, BENIGN: ICD-10-CM

## 2020-12-16 PROCEDURE — 99213 OFFICE O/P EST LOW 20 MIN: CPT | Performed by: INTERNAL MEDICINE

## 2020-12-22 DIAGNOSIS — N32.81 OAB (OVERACTIVE BLADDER): ICD-10-CM

## 2020-12-22 RX ORDER — TOLTERODINE 4 MG/1
4 CAPSULE, EXTENDED RELEASE ORAL DAILY
Qty: 30 CAPSULE | Refills: 1 | Status: SHIPPED | OUTPATIENT
Start: 2020-12-22 | End: 2021-02-01 | Stop reason: SDUPTHER

## 2021-02-01 DIAGNOSIS — N32.81 OAB (OVERACTIVE BLADDER): ICD-10-CM

## 2021-02-01 RX ORDER — TOLTERODINE 4 MG/1
4 CAPSULE, EXTENDED RELEASE ORAL DAILY
Qty: 90 CAPSULE | Refills: 1 | Status: SHIPPED | OUTPATIENT
Start: 2021-02-01

## 2021-02-01 NOTE — TELEPHONE ENCOUNTER
Script for the requested medication was queued and forwarded to the Advanced Practitioner covering the [place] location for approval

## 2021-02-01 NOTE — TELEPHONE ENCOUNTER
Patient is requesting a refill on her tolterodine (DETROL LA) 4 mg 24 hr capsule  [place] and would like to know if it's possible to get refill where she is currently staying  Please call patient to let her know if this is possible  Thank you!